# Patient Record
Sex: MALE | Race: ASIAN | NOT HISPANIC OR LATINO | URBAN - METROPOLITAN AREA
[De-identification: names, ages, dates, MRNs, and addresses within clinical notes are randomized per-mention and may not be internally consistent; named-entity substitution may affect disease eponyms.]

---

## 2017-05-23 ENCOUNTER — EMERGENCY (EMERGENCY)
Facility: HOSPITAL | Age: 22
LOS: 1 days | Discharge: HOME | End: 2017-05-23
Admitting: EMERGENCY MEDICINE
Payer: MEDICAID

## 2017-05-23 VITALS
DIASTOLIC BLOOD PRESSURE: 59 MMHG | HEART RATE: 70 BPM | TEMPERATURE: 98 F | OXYGEN SATURATION: 99 % | SYSTOLIC BLOOD PRESSURE: 124 MMHG | RESPIRATION RATE: 16 BRPM

## 2017-05-23 PROCEDURE — 99283 EMERGENCY DEPT VISIT LOW MDM: CPT

## 2017-05-23 NOTE — ED ADULT TRIAGE NOTE - CHIEF COMPLAINT QUOTE
Pt c/o "jock itch" that making it "hard for him to walk" & intermit lower R back pain. Denies dysuria, hematuria.

## 2017-05-24 RX ORDER — FLUCONAZOLE 150 MG/1
150 TABLET ORAL ONCE
Qty: 0 | Refills: 0 | Status: COMPLETED | OUTPATIENT
Start: 2017-05-24 | End: 2017-05-24

## 2017-05-24 RX ORDER — CEPHALEXIN 500 MG
500 CAPSULE ORAL ONCE
Qty: 0 | Refills: 0 | Status: COMPLETED | OUTPATIENT
Start: 2017-05-24 | End: 2017-05-24

## 2017-05-24 RX ORDER — CEPHALEXIN 500 MG
1 CAPSULE ORAL
Qty: 28 | Refills: 0 | OUTPATIENT
Start: 2017-05-24 | End: 2017-05-31

## 2017-05-24 RX ORDER — FLUCONAZOLE 150 MG/1
1 TABLET ORAL
Qty: 2 | Refills: 0 | OUTPATIENT
Start: 2017-05-24

## 2017-05-24 RX ADMIN — Medication 500 MILLIGRAM(S): at 00:35

## 2017-05-24 RX ADMIN — FLUCONAZOLE 150 MILLIGRAM(S): 150 TABLET ORAL at 00:35

## 2017-05-24 NOTE — ED PROVIDER NOTE - OBJECTIVE STATEMENT
21 yo M with no significant PMHx, presents to the ED c/o tinea cruris x 2 months. Pt has seen his PCP who has given him topical clotrimazole, miconazole, and Lotrisone without relief. States now the rash has become more painful and he doesn't know if it's infected. Denies fever, chills, penile discharge, or other complaints. No symptoms of athletes foot. 23 yo M with no significant PMHx, presents to the ED c/o tinea cruris x 2 months. Pt has seen his PCP who has given him topical clotrimazole, miconazole, and Lotrisone without relief. States now the rash has become more painful and he doesn't know if it's infected. Denies fever, chills, penile discharge, or other complaints. Denies symptoms of athletes foot.

## 2017-05-24 NOTE — ED PROVIDER NOTE - MEDICAL DECISION MAKING DETAILS
21 yo M with tinea cruris. Plan PO diflucan once a week for 3 weeks and keflex to cover for overlying cellulitis. 21 yo M with tinea cruris failed with topical azoles. Plan PO diflucan once a week for 3 weeks and keflex to cover for overlying cellulitis.

## 2017-05-24 NOTE — ED PROVIDER NOTE - NS ED MD SCRIBE ATTENDING SCRIBE SECTIONS
HISTORY OF PRESENT ILLNESS/DISPOSITION/HIV/PAST MEDICAL/SURGICAL/SOCIAL HISTORY/REVIEW OF SYSTEMS/PHYSICAL EXAM/VITAL SIGNS( Pullset)

## 2017-05-24 NOTE — ED PROVIDER NOTE - SKIN RASH DESCRIPTION
macerated and warm skin surrounding inguinal region overlying tinea cruris. some pustules noted however no palpable induration or abscesses noted

## 2017-05-24 NOTE — ED PROVIDER NOTE - PROGRESS NOTE DETAILS
The scribe's documentation has been prepared under my direction and personally reviewed by me in its entirety. I confirm that the note above accurately reflects all work, treatment, procedures, and medical decision making performed by me.  SONIA BONDS PA-C

## 2017-05-24 NOTE — ED PROVIDER NOTE - PHYSICAL EXAMINATION
macerated and warm skin surrounding inguinal region overlying tinea cruris. some pustules noted however no palpable induration or abscesses noted. Non tender to palpation, no crepitus Macerated and warm skin surrounding inguinal region overlying hyperpigmentation consistent with tinea cruris. Some pustules overlying hair follicles however no palpable induration, tenderness or fluctuance. Non tender to palpation, no crepitus Macerated and warm skin surrounding inguinal region overlying hyperpigmentation consistent with tinea cruris. Some pustules overlying hair follicles however no palpable induration, tenderness or fluctuance. Non tender to palpation, no crepitus    Chaperone Gopi Franco RN

## 2021-04-05 ENCOUNTER — APPOINTMENT (OUTPATIENT)
Dept: DISASTER EMERGENCY | Facility: OTHER | Age: 26
End: 2021-04-05
Payer: COMMERCIAL

## 2021-04-05 PROCEDURE — 0001A: CPT

## 2021-04-26 ENCOUNTER — APPOINTMENT (OUTPATIENT)
Dept: DISASTER EMERGENCY | Facility: OTHER | Age: 26
End: 2021-04-26
Payer: COMMERCIAL

## 2021-04-26 PROCEDURE — 0002A: CPT

## 2021-05-19 ENCOUNTER — IMPORTED ENCOUNTER (OUTPATIENT)
Dept: URBAN - METROPOLITAN AREA CLINIC 38 | Facility: CLINIC | Age: 26
End: 2021-05-19

## 2021-06-23 PROBLEM — Z00.00 ENCOUNTER FOR PREVENTIVE HEALTH EXAMINATION: Status: ACTIVE | Noted: 2021-06-23

## 2021-06-29 ENCOUNTER — APPOINTMENT (OUTPATIENT)
Dept: SPINE | Facility: CLINIC | Age: 26
End: 2021-06-29
Payer: COMMERCIAL

## 2021-06-29 VITALS
SYSTOLIC BLOOD PRESSURE: 129 MMHG | OXYGEN SATURATION: 99 % | HEART RATE: 65 BPM | BODY MASS INDEX: 24.33 KG/M2 | WEIGHT: 155 LBS | HEIGHT: 67 IN | DIASTOLIC BLOOD PRESSURE: 94 MMHG

## 2021-06-29 DIAGNOSIS — Z78.9 OTHER SPECIFIED HEALTH STATUS: ICD-10-CM

## 2021-06-29 PROCEDURE — 99072 ADDL SUPL MATRL&STAF TM PHE: CPT

## 2021-06-29 PROCEDURE — 99202 OFFICE O/P NEW SF 15 MIN: CPT

## 2021-07-08 ENCOUNTER — APPOINTMENT (OUTPATIENT)
Dept: SPINE | Facility: CLINIC | Age: 26
End: 2021-07-08
Payer: COMMERCIAL

## 2021-07-08 ENCOUNTER — NON-APPOINTMENT (OUTPATIENT)
Age: 26
End: 2021-07-08

## 2021-07-08 VITALS
WEIGHT: 155 LBS | OXYGEN SATURATION: 95 % | SYSTOLIC BLOOD PRESSURE: 117 MMHG | HEIGHT: 67 IN | HEART RATE: 66 BPM | BODY MASS INDEX: 24.33 KG/M2 | DIASTOLIC BLOOD PRESSURE: 71 MMHG

## 2021-07-08 DIAGNOSIS — M54.5 LOW BACK PAIN: ICD-10-CM

## 2021-07-08 DIAGNOSIS — Z83.3 FAMILY HISTORY OF DIABETES MELLITUS: ICD-10-CM

## 2021-07-08 DIAGNOSIS — Z78.9 OTHER SPECIFIED HEALTH STATUS: ICD-10-CM

## 2021-07-08 DIAGNOSIS — M76.30 ILIOTIBIAL BAND SYNDROME, UNSPECIFIED LEG: ICD-10-CM

## 2021-07-08 DIAGNOSIS — M54.2 CERVICALGIA: ICD-10-CM

## 2021-07-08 PROCEDURE — 99072 ADDL SUPL MATRL&STAF TM PHE: CPT

## 2021-07-08 PROCEDURE — 99212 OFFICE O/P EST SF 10 MIN: CPT

## 2021-07-08 NOTE — ASSESSMENT
[FreeTextEntry1] : \par 26 year old male with a two month history of neck and back pain.  No radicular symptoms and normal neurological examination.   PT for six weeks for back and neck pain was prescribed.  He will obtain a lumbar and cervical flexion/extension xray was ordered.  He will  in four weeks with imaging for review and if his condition changes with weakness or incontinence he will contact the office.  \par \par       \par \par \par CC:\par Dmitriy Nair MD

## 2021-07-08 NOTE — REASON FOR VISIT
[Follow-Up: _____] : a [unfilled] follow-up visit [Other: _____] : [unfilled] [FreeTextEntry1] : Today I had the pleasure in seeing Mr. Bradley who is a young 26 year old make with a two month history of neck and back pain.   The pain is a 4/10  and he has no associated arm or leg pain.  The neck pain is non radiating and is an ache.  He has used heat and Motrin for the pain with no relief.  The pain began when he was weight lifting and has not resolved in over two months.  He does not report any arm weakness or coordination issues.  \par \par His lower back pain is present at a 5/10.  He has taken Motrin for his pain, which is constant and has not had any beneficial effect.  He has not been able to carry out his  regular activity since the pain began two months ago after weight lifting.  The back pain is across the back and does radiate into his buttocks bilaterally.  No leg pain is reported.  No tingling and numbness is reported.  He has no leg weakness and no urine or bowel dysfunction.  Daily stretching, heat and Tylenol have not relieved his pain.  He has lateral thigh pain as well with tightness.  The back and thigh pain is described as a dull ache.  He has no  balance or walking difficulty and can walk for miles.  He has decreased pain since last visit and has not begun PT due to scheduling issues, but tomorrow he has an appointment.  \par   Cervical and lumbar flexion extension x-rays do not show any dynamic instability. He continues to have nonradiating neck and back pain and has not as yet begun in the physical therapy.

## 2021-07-08 NOTE — PHYSICAL EXAM
[General Appearance - Alert] : alert [General Appearance - In No Acute Distress] : in no acute distress [Oriented To Time, Place, And Person] : oriented to person, place, and time [Impaired Insight] : insight and judgment were intact [Affect] : the affect was normal [Person] : oriented to person [Place] : oriented to place [Time] : oriented to time [Short Term Intact] : short term memory intact [Remote Intact] : remote memory intact [Span Intact] : the attention span was normal [Concentration Intact] : normal concentrating ability [Fluency] : fluency intact [Comprehension] : comprehension intact [Current Events] : adequate knowledge of current events [Past History] : adequate knowledge of personal past history [Vocabulary] : adequate range of vocabulary [Cranial Nerves Optic (II)] : visual acuity intact bilaterally,  pupils equal round and reactive to light [Cranial Nerves Oculomotor (III)] : extraocular motion intact [Cranial Nerves Trigeminal (V)] : facial sensation intact symmetrically [Cranial Nerves Facial (VII)] : face symmetrical [Cranial Nerves Vestibulocochlear (VIII)] : hearing was intact bilaterally [Cranial Nerves Glossopharyngeal (IX)] : tongue and palate midline [Cranial Nerves Accessory (XI - Cranial And Spinal)] : head turning and shoulder shrug symmetric [Cranial Nerves Hypoglossal (XII)] : there was no tongue deviation with protrusion [Motor Tone] : muscle tone was normal in all four extremities [Motor Strength] : muscle strength was normal in all four extremities [No Muscle Atrophy] : normal bulk in all four extremities [Sensation Tactile Decrease] : light touch was intact [Balance] : balance was intact [2+] : Ankle jerk left 2+ [No Visual Abnormalities] : no visible abnormailities [No Scoliosis] : no scoliosis [Full ROM] : full ROM [No Pain with ROM] : no pain with motion in any direction [Normal] : normal [Able to toe walk] : the patient was able to toe walk [Able to heel walk] : the patient was able to heel walk [Intact] : all sensory within normal limits bilaterally [Sclera] : the sclera and conjunctiva were normal [Outer Ear] : the ears and nose were normal in appearance [Neck Appearance] : the appearance of the neck was normal [] : no respiratory distress [Abnormal Walk] : normal gait [Skin Color & Pigmentation] : normal skin color and pigmentation [Past-pointing] : there was no past-pointing [Tremor] : no tremor present [___] : absent on the right [___] : absent on the left [Hilton] : Hilton's sign was not demonstrated [FreeTextEntry9] : one beat clonus on right side  [Straight-Leg Raise Test - Left] : straight leg raise of the left leg was negative [Straight-Leg Raise Test - Right] : straight leg raise  of the right leg was negative

## 2021-07-08 NOTE — REASON FOR VISIT
[New Patient Visit] : a new patient visit [Other: _____] : [unfilled] [FreeTextEntry1] : back and neck pain

## 2021-07-08 NOTE — HISTORY OF PRESENT ILLNESS
[< 3 months] : less than 3 months [FreeTextEntry1] : Neck and back pain  [de-identified] : \par Today I had the pleasure in seeing Mr. Bradley who is a young 26 year old make with a two month history of neck and back pain.   The pain is a 5/10  and he has no associated arm or leg pain.  The neck pain is non radiating and is an ache.  He has used heat and Motrin for the pain with no relief.  The pain began when he was weight lifting and has not resolved in over two months.  He does not report any arm weakness or coordination issues.  \par \par His lower back pain is present at a 5/10.  He has taken Motrin for his pain, which is constant and has not had any beneficial effect.  He has not been able to carry out his  regular activity since the pain began two months ago after weight lifting.  The back pain is across the back and does radiate into his buttocks bilaterally.  No leg pain is reported.  No tingling and numbness is reported.  He has no leg weakness and no urine or bowel dysfunction.  Daily stretching, heat and Tylenol have not relieved his pain.  He has lateral thigh pain as well with tightness.  The back and thigh pain is described as a dull ache.  He has no  balance or walking difficulty and can walk for miles.  No formal treatment was initiated.

## 2021-07-08 NOTE — ASSESSMENT
[FreeTextEntry1] : \par Lower back and neck pain.  Pain improved since last visit.  He will return if his pain increases or leg weakness occurs.  His lumbar and neck xrays are normal.